# Patient Record
Sex: MALE | Race: WHITE | Employment: OTHER | URBAN - METROPOLITAN AREA
[De-identification: names, ages, dates, MRNs, and addresses within clinical notes are randomized per-mention and may not be internally consistent; named-entity substitution may affect disease eponyms.]

---

## 2019-06-06 PROCEDURE — 84402 ASSAY OF FREE TESTOSTERONE: CPT | Performed by: INTERNAL MEDICINE

## 2019-06-06 PROCEDURE — 84403 ASSAY OF TOTAL TESTOSTERONE: CPT | Performed by: INTERNAL MEDICINE

## 2019-06-06 PROCEDURE — 36415 COLL VENOUS BLD VENIPUNCTURE: CPT | Performed by: INTERNAL MEDICINE

## 2022-05-06 NOTE — LETTER
----- Message from [facility], LPN sent at 7/1/2051 11:14 AM EDT -----  Regarding: FW: Test Results    ----- Message -----  From: Shanell Mckeon  Sent: 5/4/2022   3:41 PM EDT  To: Christine Woodard Nurse Pool  Subject: Test Results                                     Will someone be contacting to explain the results of my lab work and ultrasound ordered by Dr. Gavin Barry? Date: 8/28/2023  Patient name: Hollis Herrera  YOB: 1945  Medical Record Number: [id number]  Primary Coverage: Payor: Nicolas Voss / Plan: Nicolas Voss / Product Type: Medicare /   Secondary Coverage:   Insurance ID: [id number]  Patient Address: [address] [facility] [address] [facility] [address]  Telephone Information:   Home Phone [phone]   Work Phone [phone]   Mobile [phone]         Encounter Date: 8/28/2023  Provider: Marilynn Prakash MD  Diagnosis: No diagnosis found. Wound 08/14/23 Old surgical Leg Right;Lateral;Lower (Active)   Date First Assessed: 08/14/23   Primary Wound Type: Old surgical  Location: Leg  Wound Location Orientation: Right;Lateral;Lower      Assessments 8/14/2023  8:35 AM 8/28/2023  8:00 AM   Wound Image       Closure Not approximated Not approximated   Drainage Amount Moderate Moderate   Drainage Description Yellow Yellow   Treatments Wound  Wound ;Saline   Dressing Pam;Hydrofera ready Pam;Hydrofera ready   Dressing Changed Changed Changed   Dressing Status Clean;Dry; Intact Clean;Dry; Intact   Wound Length (cm) 1.7 cm 1.4 cm   Wound Width (cm) 0.8 cm 0.6 cm   Wound Surface Area (cm^2) 1.36 cm^2 0.84 cm^2   Wound Depth (cm) 0.5 cm 0.2 cm   Wound Volume (cm^3) 0.68 cm^3 0. 168 cm^3   Wound Healing % -- 75   Margins Epibole (Rolled edges) Epibole (Rolled edges)   Non-staged Wound Description Full thickness Full thickness   Marlene-wound Assessment Red Red   Wound Granulation Tissue Pink Pink   Wound Bed Granulation (%) 90 % 90 %   Wound Bed Epithelium (%) 0 % 0 %   Wound Bed Slough (%) 10 % 10 %   Wound Bed Eschar (%) 0 % 0 %   Wound Odor None None   Undermining 12-12 oclock 0.5 cm 12 to 12=0.2cm       No associated orders. Wound Information/Order:  Wound Number:  All wounds  Product: Helena collagen (primary dressing); hydrofera ready blue (secondary dressing); kerlix; saline  Dispense: 15 days  Dressing Frequency:Change dressing 3x per week    Was a Debridement performed: Yes, Debridement type: selective

## 2023-08-14 ENCOUNTER — OFFICE VISIT (OUTPATIENT)
Dept: WOUND CARE | Facility: HOSPITAL | Age: 78
End: 2023-08-14
Attending: FAMILY MEDICINE
Payer: MEDICARE

## 2023-08-14 ENCOUNTER — HOSPITAL ENCOUNTER (OUTPATIENT)
Dept: GENERAL RADIOLOGY | Facility: HOSPITAL | Age: 78
Discharge: HOME | End: 2023-08-14
Attending: FAMILY MEDICINE
Payer: MEDICARE

## 2023-08-14 VITALS
SYSTOLIC BLOOD PRESSURE: 200 MMHG | HEIGHT: 72 IN | DIASTOLIC BLOOD PRESSURE: 98 MMHG | HEART RATE: 95 BPM | TEMPERATURE: 97 F | BODY MASS INDEX: 29.8 KG/M2 | WEIGHT: 220 LBS

## 2023-08-14 DIAGNOSIS — S81.801A OPEN WOUND OF RIGHT LOWER LEG, INITIAL ENCOUNTER: Primary | ICD-10-CM

## 2023-08-14 DIAGNOSIS — S81.801A OPEN WOUND OF RIGHT LOWER LEG, INITIAL ENCOUNTER: ICD-10-CM

## 2023-08-14 PROCEDURE — 73590 X-RAY EXAM OF LOWER LEG: CPT | Performed by: FAMILY MEDICINE

## 2023-08-14 PROCEDURE — 99204 OFFICE O/P NEW MOD 45 MIN: CPT | Performed by: FAMILY MEDICINE

## 2023-08-14 NOTE — PROGRESS NOTES
Weekly Wound Education Note                      Notes: right leg- betamethasone to periwound; fibracol; hydrofera ready; karin

## 2023-08-14 NOTE — PATIENT INSTRUCTIONS
PATIENT INSTRUCTIONS      FOR Makeda Jacobo ,  1945    DATE OF SERVICE: 2023      PLEASE CONTACT YOUR PRIMARY DOCTOR REGARDING YOUR HIGH BLOOD PRESSURE     On Thursday, 2023:  Apply a piece of Fibracol into the wound base and also tuck into the areas of undermining. Moisten this with a few drops of saline. Cover the entire wound with Hydrofera blue ready dressing. Cover with gauze roll and secure with tape. Apply a cortisone cream to the area of rash once a day until it is gone. Follow up with Dr. Bridgette Butler 1 Mirza Lighter with a daily multivitamin   Low salt diet  Intense blood sugar control - Goal Blood sugar below 180 at all times recommended. Increase protein intake / consider protein supplements - see below  Elevate extremities at all times when sitting / laying down. No tobacco use     DIETARY MODIFICATIONS TO HELP WITH WOUND HEALING:          Please follow any restrictions to diet given by your other doctors     Protein: meats, beans, eggs, milk and yogurt particularly [place] yogurt), tofu, soy nuts, soy protein products     Vitamin C: citrus fruits and juices, strawberries, tomatoes, tomato juice, peppers, baked potatoes, spinach, broccoli, cauliflower, [place] sprouts, cabbage     Vitamin A: dark greens, leafy vegetables, orange or yellow vegetables, cantaloupe, fortified dairy products, liver, fortified cereals     Zinc: fortified cereals, red meats, seafood     Consider Jose by [employer] (These are essential branch chain amino acids that help with tissue building and wound healing) and take 2 packets/day. You can order online at [employer] or [employer] REMINDERS:     The treatment plan has been discussed at length with you and your provider. Follow all instructions carefully, it is very important.  If you do not follow all instructions, you are at risk of your wound not healing, infection, possible loss of limb and even loss of life. Please call the clinic at [phone] during regular business hours ( [address]) if you notice increased redness, warmth, pain or pus like drainage or start running a fever greater than 100.3. For after hour emergencies, please call your primary physician or go to the nearest emergency room. If your blood pressure is elevated, follow up with your primary care doctor and/or cardiologist as soon as possible.

## 2023-08-21 ENCOUNTER — OFFICE VISIT (OUTPATIENT)
Dept: WOUND CARE | Facility: HOSPITAL | Age: 78
End: 2023-08-21
Attending: FAMILY MEDICINE
Payer: MEDICARE

## 2023-08-21 VITALS
TEMPERATURE: 99 F | OXYGEN SATURATION: 97 % | DIASTOLIC BLOOD PRESSURE: 86 MMHG | SYSTOLIC BLOOD PRESSURE: 177 MMHG | RESPIRATION RATE: 20 BRPM | HEART RATE: 64 BPM

## 2023-08-21 DIAGNOSIS — S81.801D OPEN WOUND OF RIGHT LOWER LEG, SUBSEQUENT ENCOUNTER: Primary | ICD-10-CM

## 2023-08-21 PROCEDURE — 99214 OFFICE O/P EST MOD 30 MIN: CPT

## 2023-08-21 NOTE — PROGRESS NOTES
Weekly Wound Education Note    Teaching Provided To: Patient  Training Topics: Nutrition;Cleasing and general instructions;Skin care  Training Method: Explain/Verbal           Notes: right leg- collagen; ready; karin

## 2023-08-21 NOTE — PATIENT INSTRUCTIONS
PATIENT INSTRUCTIONS      FOR Lesley Chavarria , JJ 1945    DATE OF SERVICE: 2023      PLEASE CONTACT YOUR PRIMARY DOCTOR REGARDING YOUR HIGH BLOOD PRESSURE     Every 2-3 days on Wednesday and Saturday  Apply a piece of collagen into the wound base and also tuck into the areas of undermining. Moisten this with a few drops of saline. Cover the entire wound with Hydrofera blue ready dressing. Cover with gauze roll and secure with tape. Apply a cortisone cream to the area of rash once a day until it is gone. Follow up with Dr. Shanta Fitzgerald 1 Amanda Kothari with a daily multivitamin   Low salt diet  Intense blood sugar control - Goal Blood sugar below 180 at all times recommended. Increase protein intake / consider protein supplements - see below  Elevate extremities at all times when sitting / laying down. No tobacco use     DIETARY MODIFICATIONS TO HELP WITH WOUND HEALING:          Please follow any restrictions to diet given by your other doctors     Protein: meats, beans, eggs, milk and yogurt particularly [place] yogurt), tofu, soy nuts, soy protein products     Vitamin C: citrus fruits and juices, strawberries, tomatoes, tomato juice, peppers, baked potatoes, spinach, broccoli, cauliflower, [place] sprouts, cabbage     Vitamin A: dark greens, leafy vegetables, orange or yellow vegetables, cantaloupe, fortified dairy products, liver, fortified cereals     Zinc: fortified cereals, red meats, seafood     Consider Jose by [employer] (These are essential branch chain amino acids that help with tissue building and wound healing) and take 2 packets/day. You can order online at [employer] or [employer] REMINDERS:     The treatment plan has been discussed at length with you and your provider. Follow all instructions carefully, it is very important.  If you do not follow all instructions, you are at risk of your wound not healing, infection, possible loss of limb and even loss of life. Please call the clinic at [phone] during regular business hours ( [address]) if you notice increased redness, warmth, pain or pus like drainage or start running a fever greater than 100.3. For after hour emergencies, please call your primary physician or go to the nearest emergency room. If your blood pressure is elevated, follow up with your primary care doctor and/or cardiologist as soon as possible.

## 2023-08-28 ENCOUNTER — OFFICE VISIT (OUTPATIENT)
Dept: WOUND CARE | Facility: HOSPITAL | Age: 78
End: 2023-08-28
Attending: FAMILY MEDICINE
Payer: MEDICARE

## 2023-08-28 VITALS
DIASTOLIC BLOOD PRESSURE: 92 MMHG | TEMPERATURE: 97 F | OXYGEN SATURATION: 97 % | RESPIRATION RATE: 20 BRPM | SYSTOLIC BLOOD PRESSURE: 200 MMHG | HEART RATE: 62 BPM

## 2023-08-28 DIAGNOSIS — S81.801D OPEN WOUND OF RIGHT LOWER LEG, SUBSEQUENT ENCOUNTER: Primary | ICD-10-CM

## 2023-08-28 PROCEDURE — 99214 OFFICE O/P EST MOD 30 MIN: CPT | Performed by: FAMILY MEDICINE

## 2023-08-28 NOTE — PROGRESS NOTES
Weekly Wound Education Note    Teaching Provided To: Patient  Training Topics: Cleasing and general instructions;Dressing;Edema control;Skin care              Notes: right leg wound- levy; ready; karin

## 2023-08-28 NOTE — PATIENT INSTRUCTIONS
PATIENT INSTRUCTIONS      FOR Michael Webb JJ 1945    DATE OF SERVICE: 2023      PLEASE CONTACT YOUR PRIMARY DOCTOR REGARDING YOUR HIGH BLOOD PRESSURE     Every 2-3 days on ,  and   Apply a piece of collagen into the wound base and also tuck into the areas of undermining. Moisten this with a few drops of saline. Cover the entire wound with Hydrofera blue ready dressing. Cover with gauze roll and secure with tape. Apply a cortisone cream to the area of rash once a day until it is gone. Follow up with Dr. Jorge Gross 1 Kathleen Flavors with a daily multivitamin   Low salt diet  Intense blood sugar control - Goal Blood sugar below 180 at all times recommended. Increase protein intake / consider protein supplements - see below  Elevate extremities at all times when sitting / laying down. No tobacco use     DIETARY MODIFICATIONS TO HELP WITH WOUND HEALING:          Please follow any restrictions to diet given by your other doctors     Protein: meats, beans, eggs, milk and yogurt particularly [place] yogurt), tofu, soy nuts, soy protein products     Vitamin C: citrus fruits and juices, strawberries, tomatoes, tomato juice, peppers, baked potatoes, spinach, broccoli, cauliflower, [place] sprouts, cabbage     Vitamin A: dark greens, leafy vegetables, orange or yellow vegetables, cantaloupe, fortified dairy products, liver, fortified cereals     Zinc: fortified cereals, red meats, seafood     Consider Jose by [employer] (These are essential branch chain amino acids that help with tissue building and wound healing) and take 2 packets/day. You can order online at [employer] or [employer] REMINDERS:     The treatment plan has been discussed at length with you and your provider. Follow all instructions carefully, it is very important.  If you do not follow all instructions, you are at risk of your wound not healing, infection, possible loss of limb and even loss of life. Please call the clinic at [phone] during regular business hours ( [address]) if you notice increased redness, warmth, pain or pus like drainage or start running a fever greater than 100.3. For after hour emergencies, please call your primary physician or go to the nearest emergency room. If your blood pressure is elevated, follow up with your primary care doctor and/or cardiologist as soon as possible.

## 2023-09-06 ENCOUNTER — TELEPHONE (OUTPATIENT)
Dept: WOUND CARE | Facility: HOSPITAL | Age: 78
End: 2023-09-06

## 2023-09-06 NOTE — TELEPHONE ENCOUNTER
Pt called clinic this morning and left a voicemail stating he needs a call back. Pt is currently receiving wound care at [place] [address], RN did try calling him to tell him he called the wrong clinic and [facility] but he did not answer and his voicemail box is full. RN sent message to staff RN at [facility] and doctor to inform them of patients call and concerns.

## 2023-09-11 ENCOUNTER — OFFICE VISIT (OUTPATIENT)
Dept: WOUND CARE | Facility: HOSPITAL | Age: 78
End: 2023-09-11
Attending: FAMILY MEDICINE
Payer: MEDICARE

## 2023-09-11 VITALS
HEART RATE: 62 BPM | OXYGEN SATURATION: 98 % | SYSTOLIC BLOOD PRESSURE: 182 MMHG | TEMPERATURE: 98 F | RESPIRATION RATE: 17 BRPM | DIASTOLIC BLOOD PRESSURE: 83 MMHG

## 2023-09-11 DIAGNOSIS — S81.801D OPEN WOUND OF RIGHT LOWER LEG, SUBSEQUENT ENCOUNTER: Primary | ICD-10-CM

## 2023-09-11 PROCEDURE — 99214 OFFICE O/P EST MOD 30 MIN: CPT | Performed by: FAMILY MEDICINE

## 2023-09-18 ENCOUNTER — OFFICE VISIT (OUTPATIENT)
Dept: WOUND CARE | Facility: HOSPITAL | Age: 78
End: 2023-09-18
Attending: FAMILY MEDICINE
Payer: MEDICARE

## 2023-09-18 VITALS
TEMPERATURE: 98 F | SYSTOLIC BLOOD PRESSURE: 164 MMHG | OXYGEN SATURATION: 96 % | DIASTOLIC BLOOD PRESSURE: 84 MMHG | HEART RATE: 61 BPM

## 2023-09-18 DIAGNOSIS — S81.801D OPEN WOUND OF RIGHT LOWER LEG, SUBSEQUENT ENCOUNTER: Primary | ICD-10-CM

## 2023-09-18 PROCEDURE — 99214 OFFICE O/P EST MOD 30 MIN: CPT | Performed by: FAMILY MEDICINE

## 2023-09-18 NOTE — PATIENT INSTRUCTIONS
PATIENT INSTRUCTIONS      FOR Santiago Chavez JJ 1945    DATE OF SERVICE: 2023      PLEASE CONTACT YOUR PRIMARY DOCTOR REGARDING YOUR HIGH BLOOD PRESSURE     On  and ,  Apply a piece of endoform collagen into the wound base and also tuck into the areas of undermining. Moisten this with a few drops of saline. Cover the entire wound with Hydrofera blue ready dressing. Cover with gauze roll and secure with tape. Follow up with Dr. Gary Aranda with a daily multivitamin   Low salt diet  Intense blood sugar control - Goal Blood sugar below 180 at all times recommended. Increase protein intake / consider protein supplements - see below  Elevate extremities at all times when sitting / laying down. No tobacco use     DIETARY MODIFICATIONS TO HELP WITH WOUND HEALING:          Please follow any restrictions to diet given by your other doctors     Protein: meats, beans, eggs, milk and yogurt particularly [place] yogurt), tofu, soy nuts, soy protein products     Vitamin C: citrus fruits and juices, strawberries, tomatoes, tomato juice, peppers, baked potatoes, spinach, broccoli, cauliflower, [place] sprouts, cabbage     Vitamin A: dark greens, leafy vegetables, orange or yellow vegetables, cantaloupe, fortified dairy products, liver, fortified cereals     Zinc: fortified cereals, red meats, seafood     Consider Jose by [employer] (These are essential branch chain amino acids that help with tissue building and wound healing) and take 2 packets/day. You can order online at [employer] or [employer] REMINDERS:     The treatment plan has been discussed at length with you and your provider. Follow all instructions carefully, it is very important. If you do not follow all instructions, you are at risk of your wound not healing, infection, possible loss of limb and even loss of life.   Please call the clinic at [phone] during regular business hours ( 8AM - 4PM) if you notice increased redness, warmth, pain or pus like drainage or start running a fever greater than 100.3. For after hour emergencies, please call your primary physician or go to the nearest emergency room. If your blood pressure is elevated, follow up with your primary care doctor and/or cardiologist as soon as possible.

## 2023-09-18 NOTE — PROGRESS NOTES
Weekly Wound Education Note    Teaching Provided To: Patient  Training Topics: Cleasing and general instructions;Dressing;Edema control;Skin care  Training Method: Explain/Verbal;Demonstration  Training Response: Patient responds and understands        Notes: RLE- endoform; ready; karin; spandagrip

## 2023-10-02 ENCOUNTER — OFFICE VISIT (OUTPATIENT)
Dept: WOUND CARE | Facility: HOSPITAL | Age: 78
End: 2023-10-02
Attending: FAMILY MEDICINE
Payer: MEDICARE

## 2023-10-02 ENCOUNTER — TELEPHONE (OUTPATIENT)
Dept: WOUND CARE | Facility: HOSPITAL | Age: 78
End: 2023-10-02

## 2023-10-02 VITALS
OXYGEN SATURATION: 92 % | DIASTOLIC BLOOD PRESSURE: 83 MMHG | SYSTOLIC BLOOD PRESSURE: 169 MMHG | RESPIRATION RATE: 18 BRPM | TEMPERATURE: 97 F | HEART RATE: 66 BPM

## 2023-10-02 DIAGNOSIS — S81.801D OPEN WOUND OF RIGHT LOWER LEG, SUBSEQUENT ENCOUNTER: Primary | ICD-10-CM

## 2023-10-02 PROCEDURE — 99214 OFFICE O/P EST MOD 30 MIN: CPT | Performed by: FAMILY MEDICINE

## 2023-10-02 NOTE — PATIENT INSTRUCTIONS
PATIENT INSTRUCTIONS      FOR Nena Nascimento ,  1945    DATE OF SERVICE: 10/2/2023          On Monday, Wednesday, and Friday:     Apply a piece of endoform collagen into the wound base and also tuck into the areas of undermining. Moisten this with a few drops of saline. Cover the entire wound with Hydrofera blue ready dressing. Cover with gauze roll and secure with tape. Follow up with Dr. Oskar Roque [address] [place] [address] with a daily multivitamin   Low salt diet  Intense blood sugar control - Goal Blood sugar below 180 at all times recommended. Increase protein intake / consider protein supplements - see below  Elevate extremities at all times when sitting / laying down. No tobacco use     DIETARY MODIFICATIONS TO HELP WITH WOUND HEALING:          Please follow any restrictions to diet given by your other doctors     Protein: meats, beans, eggs, milk and yogurt particularly [place] yogurt), tofu, soy nuts, soy protein products     Vitamin C: citrus fruits and juices, strawberries, tomatoes, tomato juice, peppers, baked potatoes, spinach, broccoli, cauliflower, [place] sprouts, cabbage     Vitamin A: dark greens, leafy vegetables, orange or yellow vegetables, cantaloupe, fortified dairy products, liver, fortified cereals     Zinc: fortified cereals, red meats, seafood     Consider Jose by [employer] (These are essential branch chain amino acids that help with tissue building and wound healing) and take 2 packets/day. You can order online at [employer] or [employer] REMINDERS:     The treatment plan has been discussed at length with you and your provider. Follow all instructions carefully, it is very important. If you do not follow all instructions, you are at risk of your wound not healing, infection, possible loss of limb and even loss of life.   Please call the clinic at [phone] during regular business hours ( [address]) if you notice increased redness, warmth, pain or pus like drainage or start running a fever greater than 100.3. For after hour emergencies, please call your primary physician or go to the nearest emergency room. If your blood pressure is elevated, follow up with your primary care doctor and/or cardiologist as soon as possible.

## 2023-10-02 NOTE — PROGRESS NOTES
Weekly Wound Education Note    Teaching Provided To: Patient  Training Topics: Cleasing and general instructions;Dressing  Training Method: Demonstration;Explain/Verbal  Training Response: Patient responds and understands        Notes: right lateral lower leg-endoform, hydrofera blue transfer, karin, RTC in 2 weeks, Kerecis ordered

## 2023-10-16 ENCOUNTER — OFFICE VISIT (OUTPATIENT)
Dept: WOUND CARE | Facility: HOSPITAL | Age: 78
End: 2023-10-16
Attending: FAMILY MEDICINE
Payer: MEDICARE

## 2023-10-16 VITALS
HEART RATE: 52 BPM | DIASTOLIC BLOOD PRESSURE: 87 MMHG | SYSTOLIC BLOOD PRESSURE: 195 MMHG | TEMPERATURE: 97 F | OXYGEN SATURATION: 98 % | RESPIRATION RATE: 18 BRPM

## 2023-10-16 DIAGNOSIS — S81.801D OPEN WOUND OF RIGHT LOWER LEG, SUBSEQUENT ENCOUNTER: Primary | ICD-10-CM

## 2023-10-16 PROCEDURE — 15271 SKIN SUB GRAFT TRNK/ARM/LEG: CPT | Performed by: FAMILY MEDICINE

## 2023-10-16 NOTE — PROGRESS NOTES
Patient ID: Bhanu Medel is a 66year old male. Cellular tissue product application Old surgical Right;Lateral;Lower Leg    Date/Time: 10/16/2023 2:06 PM    Performed by: Jayme Villar MD  Authorized by: Jayme Villar MD  Associated wounds:   Wound 08/14/23 Old surgical Leg Right;Lateral;Lower  Consent:     Consent obtained:  Verbal    Consent given by:  Patient    Alternatives discussed:  No treatment  Universal protocol:     Procedure explained and questions answered to patient or proxy's satisfaction: yes      Relevant documents present and verified: yes      Site/side marked: yes      Patient identity confirmed:  Verbally with patient  Pre-procedure details:     Preparation: Patient was prepped and draped in usual sterile fashion    Anesthesia (see MAR for exact dosages): Anesthesia method:  None  Procedure details:     Location:  trunk/arms/legs    Product applied:  Kerecis omega3    Product lot #:  [id number]    Product expiration:  6/1/2026    Amount used (cm^2): 1    Amount wasted (cm^2): 1    Reason for waste:  Wound size    Saline lot #:  [id number]    Saline expiration:  5/1/2028    Secured/Fixated: Yes      Secured/Fixated with:  Mepitel 1,steristrips, mastisol  Dressing:     Dressing applied:  Steri-Strips and 4x4    Wrapped with:  Pam 4 inch  Post-procedure details:     Patient tolerance of procedure:   Tolerated well, no immediate complications

## 2023-10-16 NOTE — PROGRESS NOTES
Weekly Wound Education Note    Teaching Provided To: Patient  Training Topics: Cleasing and general instructions;Dressing  Training Method: Explain/Verbal;Demonstration  Training Response: Patient responds and understands        Notes: RLE-kerecis, mepitel1,steristrips,dry gauze, Kerecis ordered for next visit

## 2023-10-16 NOTE — PATIENT INSTRUCTIONS
PATIENT INSTRUCTIONS      FOR JJ Goss 1945    DATE OF SERVICE: 10/16/2023      Kerecis No. 1 applied today  Contact layer with Steri-Strips, do not remove mesh    4 x 4 gauze and gauze roll, change this daily and apply a 3 to 4 drops of saline through the mesh onto the wound each day        Follow up with Dr. Jeanine Mcdaniel 1 WEEK

## 2023-10-23 ENCOUNTER — OFFICE VISIT (OUTPATIENT)
Dept: WOUND CARE | Facility: HOSPITAL | Age: 78
End: 2023-10-23
Attending: FAMILY MEDICINE
Payer: MEDICARE

## 2023-10-23 VITALS
SYSTOLIC BLOOD PRESSURE: 170 MMHG | OXYGEN SATURATION: 95 % | TEMPERATURE: 97 F | RESPIRATION RATE: 17 BRPM | DIASTOLIC BLOOD PRESSURE: 76 MMHG | HEART RATE: 69 BPM

## 2023-10-23 DIAGNOSIS — S81.801D OPEN WOUND OF RIGHT LOWER LEG, SUBSEQUENT ENCOUNTER: Primary | ICD-10-CM

## 2023-10-23 PROCEDURE — 99214 OFFICE O/P EST MOD 30 MIN: CPT | Performed by: FAMILY MEDICINE

## 2023-10-23 NOTE — PATIENT INSTRUCTIONS
PATIENT INSTRUCTIONS      FOR JJ Hardin 1945    DATE OF SERVICE: 10/23/2023      The dressing should be changed on Wednesday 10/25 and Saturday 10/28  Apply endoform to the wound base  Cover with saline moistened gauze  Cover with dry gauze roll and secure with tape      Follow up with Dr. Hubert Blum 1 WEEK
po supplement

## 2023-10-23 NOTE — PROGRESS NOTES
Weekly Wound Education Note    Teaching Provided To: Patient  Training Topics: Cleasing and general instructions;Dressing  Training Method: Explain/Verbal;Demonstration  Training Response: Patient responds and understands        Notes: RLE-endoform , dry gauze, karin, patient to change dressing twice this week              [facility] [place] verification sent

## 2023-10-30 ENCOUNTER — OFFICE VISIT (OUTPATIENT)
Dept: WOUND CARE | Facility: HOSPITAL | Age: 78
End: 2023-10-30
Attending: FAMILY MEDICINE

## 2023-10-30 VITALS
SYSTOLIC BLOOD PRESSURE: 153 MMHG | OXYGEN SATURATION: 96 % | DIASTOLIC BLOOD PRESSURE: 79 MMHG | RESPIRATION RATE: 17 BRPM | HEART RATE: 60 BPM | TEMPERATURE: 97 F

## 2023-10-30 DIAGNOSIS — S81.801D OPEN WOUND OF RIGHT LOWER LEG, SUBSEQUENT ENCOUNTER: Primary | ICD-10-CM

## 2023-10-30 PROCEDURE — 99214 OFFICE O/P EST MOD 30 MIN: CPT | Performed by: FAMILY MEDICINE

## 2023-10-30 NOTE — PATIENT INSTRUCTIONS
PATIENT INSTRUCTIONS      FOR JJ Bhagat 1945    DATE OF SERVICE: 10/30/2023      Moisten a piece of Acticoat dressing with tap water before applying. Then apply a square piece of Acticoat dressing to the wound and surrounding edges and use a small piece of gauze in the center of the wound to bolster and press it down and then cover with a border gauze dressing. Change on a daily basis.         Follow up with Dr. Alfredo Zhu 1 WEEK

## 2023-10-30 NOTE — PROGRESS NOTES
Weekly Wound Education Note    Teaching Provided To: Patient  Training Topics: Cleasing and general instructions;Dressing  Training Method: Demonstration;Explain/Verbal  Training Response: Patient responds and understands        Notes: Right leg-acticoat moistened with water, dry gauze, foam dressing

## 2023-11-06 ENCOUNTER — OFFICE VISIT (OUTPATIENT)
Dept: WOUND CARE | Facility: HOSPITAL | Age: 78
End: 2023-11-06
Attending: FAMILY MEDICINE
Payer: MEDICARE

## 2023-11-06 VITALS
SYSTOLIC BLOOD PRESSURE: 125 MMHG | OXYGEN SATURATION: 96 % | HEART RATE: 75 BPM | DIASTOLIC BLOOD PRESSURE: 77 MMHG | TEMPERATURE: 98 F | RESPIRATION RATE: 18 BRPM

## 2023-11-06 DIAGNOSIS — S81.801D OPEN WOUND OF RIGHT LOWER LEG, SUBSEQUENT ENCOUNTER: Primary | ICD-10-CM

## 2023-11-06 PROCEDURE — 99214 OFFICE O/P EST MOD 30 MIN: CPT | Performed by: FAMILY MEDICINE

## 2023-11-06 NOTE — PROGRESS NOTES
Weekly Wound Education Note    Teaching Provided To: Patient  Training Topics: Cleasing and general instructions;Dressing;Negative presssure therapy  Training Method: Demonstration;Explain/Verbal  Training Response: Patient responds and understands        Notes: Right leg-Barby NPWT

## 2023-11-06 NOTE — PATIENT INSTRUCTIONS
PATIENT INSTRUCTIONS      FOR Makeda Jacobo ,  1945    DATE OF SERVICE: 2023      SHLOMO applied to the wound   The light on the device should be green, if not try to press around the dressing to ensure a good seal or apply extra tape. DO NOT get wet.          Follow up with Dr. Bridgette Butler 1 WEEK

## 2023-11-13 ENCOUNTER — OFFICE VISIT (OUTPATIENT)
Dept: WOUND CARE | Facility: HOSPITAL | Age: 78
End: 2023-11-13
Attending: FAMILY MEDICINE
Payer: MEDICARE

## 2023-11-13 VITALS
SYSTOLIC BLOOD PRESSURE: 155 MMHG | HEART RATE: 55 BPM | DIASTOLIC BLOOD PRESSURE: 84 MMHG | OXYGEN SATURATION: 97 % | RESPIRATION RATE: 17 BRPM

## 2023-11-13 DIAGNOSIS — S81.801D OPEN WOUND OF RIGHT LOWER LEG, SUBSEQUENT ENCOUNTER: Primary | ICD-10-CM

## 2023-11-13 PROCEDURE — 99214 OFFICE O/P EST MOD 30 MIN: CPT | Performed by: FAMILY MEDICINE

## 2023-11-13 NOTE — PATIENT INSTRUCTIONS
PATIENT INSTRUCTIONS      FOR JJ Montes 1945    DATE OF SERVICE: 2023    Helena collagen to the wound base  SHLOMO applied to the wound   The light on the device should be green, if not try to press around the dressing to ensure a good seal or apply extra tape. DO NOT get wet.        Nurse visit on  and   Follow up with Dr. Shaila Rodriguez 3 WEEKS

## 2023-11-13 NOTE — PROGRESS NOTES
Weekly Wound Education Note    Teaching Provided To: Patient  Training Topics: Cleasing and general instructions;Dressing;Negative presssure therapy  Training Method: Demonstration;Explain/Verbal  Training Response: Patient responds and understands        Notes: Right leg-Fibracol,Barby NPWT

## 2023-11-20 ENCOUNTER — NURSE ONLY (OUTPATIENT)
Dept: WOUND CARE | Facility: HOSPITAL | Age: 78
End: 2023-11-20
Attending: FAMILY MEDICINE
Payer: MEDICARE

## 2023-11-20 VITALS
HEART RATE: 57 BPM | TEMPERATURE: 97 F | SYSTOLIC BLOOD PRESSURE: 156 MMHG | DIASTOLIC BLOOD PRESSURE: 79 MMHG | OXYGEN SATURATION: 96 % | RESPIRATION RATE: 18 BRPM

## 2023-11-20 DIAGNOSIS — S81.801D OPEN WOUND OF RIGHT LOWER LEG, SUBSEQUENT ENCOUNTER: ICD-10-CM

## 2023-11-20 PROCEDURE — 97607 NEG PRS WND THR NDME<=50SQCM: CPT

## 2023-11-20 NOTE — PROGRESS NOTES
11/20/23 1029   Negative Pressure Wound Therapy Leg Right; Lower   Placement Date: 11/06/23   Wound Type: Surgical  Location: Leg  Wound Location Orientation: Right; Lower   Wound photographed/measured Yes   Machine Status (On) Yes   Site Assessment Dry;Pink;Pale;Yellow   Marlene-wound Assessment Pink   Unit Type SHLOMO   Dressing Type Collagen   Number of Foam Pieces Used 1   Cycle Continuous; On   Target Pressure (mmHg)   (preset 80 mmhg)   Drainage Description Serosanguineous   Dressing Status Clean;Dry; Intact   Wound 08/14/23 Old surgical Leg Right;Lateral;Lower   Date First Assessed: 08/14/23   Primary Wound Type: Old surgical  Location: Leg  Wound Location Orientation: Right;Lateral;Lower   Wound Image    Site Assessment Dry;Pink;Pale;Yellow   Closure Not approximated   Drainage Amount Small   Drainage Description Serosanguineous   Treatments Wound    Dressing Wound vac sponge  (Fibracol,Shlomo NPWT at 80 mmhg)   Dressing Changed Changed   Dressing Status Dressing Changed   Wound Length (cm) 0.9 cm   Wound Width (cm) 0.4 cm   Wound Surface Area (cm^2) 0.36 cm^2   Wound Depth (cm) 0.3 cm   Wound Volume (cm^3) 0.108 cm^3   Wound Healing % 84   Margins Epibole (Rolled edges)   Non-staged Wound Description Full thickness   Marlene-wound Assessment Pink   Wound Granulation Tissue Pink   Wound Bed Granulation (%) 90 %   Wound Bed Epithelium (%) 0 %   Wound Bed Slough (%) 10 %   Wound Bed Eschar (%) 0 %   Wound Bed Fibrin (%) 0 %   Wound Odor None   Undermining 0.2   Undermining?  Yes   Number of Undermines 1   Undermine 1 Start Position 12   Undermine 1 End Position 6     Weekly Wound Education Note    Teaching Provided To: Patient  Training Topics: Cleasing and general instructions;Dressing;Negative presssure therapy  Training Method: Demonstration;Explain/Verbal  Training Response: Patient responds and understands        Notes: Right leg-Fibracol,Shlomo NPWT

## 2023-11-27 ENCOUNTER — NURSE ONLY (OUTPATIENT)
Dept: WOUND CARE | Facility: HOSPITAL | Age: 78
End: 2023-11-27
Attending: FAMILY MEDICINE
Payer: MEDICARE

## 2023-11-27 VITALS
DIASTOLIC BLOOD PRESSURE: 90 MMHG | OXYGEN SATURATION: 98 % | RESPIRATION RATE: 18 BRPM | SYSTOLIC BLOOD PRESSURE: 154 MMHG | TEMPERATURE: 96 F | HEART RATE: 48 BPM

## 2023-11-27 DIAGNOSIS — S81.801D OPEN WOUND OF RIGHT LOWER LEG, SUBSEQUENT ENCOUNTER: Primary | ICD-10-CM

## 2023-11-27 PROCEDURE — 97607 NEG PRS WND THR NDME<=50SQCM: CPT

## 2023-11-27 NOTE — PROGRESS NOTES
11/27/23 1054   Negative Pressure Wound Therapy Leg Right; Lower   Placement Date: 11/06/23   Wound Type: Surgical  Location: Leg  Wound Location Orientation: Right; Lower   Wound photographed/measured Yes   Machine Status (On) Yes   Site Assessment Dry;Pink;Pale;Yellow   Marlene-wound Assessment Pink   Unit Type SHLOMO   Dressing Type Collagen   Number of Foam Pieces Used 1   Cycle Continuous; On   Target Pressure (mmHg)   (preset 80 mmhg)   Drainage Description Serosanguineous   Dressing Status Clean;Dry; Intact   Wound 08/14/23 Old surgical Leg Right;Lateral;Lower   Date First Assessed: 08/14/23   Primary Wound Type: Old surgical  Location: Leg  Wound Location Orientation: Right;Lateral;Lower   Wound Image    Site Assessment Dry;Pink;Pale;Yellow   Closure Not approximated   Drainage Amount Small   Drainage Description Serosanguineous   Treatments Wound    Dressing Wound vac sponge  (Fibracol,Shlomo NPWT at 80 mmhg)   Dressing Changed Changed   Dressing Status Dressing Changed   Wound Length (cm) 0.9 cm   Wound Width (cm) 0.5 cm   Wound Surface Area (cm^2) 0.45 cm^2   Wound Depth (cm) 0.3 cm   Wound Volume (cm^3) 0.135 cm^3   Wound Healing % 80   Margins Epibole (Rolled edges); Not attached   Non-staged Wound Description Full thickness   Marlene-wound Assessment Pink   Wound Granulation Tissue Pink   Wound Bed Fibrin (%) 0 %   Wound Odor None   Undermining 0.9   Undermining?  Yes   Number of Undermines 1   Undermine 1 Start Position 12   Undermine 1 End Position 12     Weekly Wound Education Note    Teaching Provided To: Patient  Training Topics: Cleasing and general instructions;Dressing;Negative presssure therapy  Training Method: Demonstration;Explain/Verbal  Training Response: Patient responds and understands        Notes: Right leg-Fibracol,Shlomo NPWT

## 2023-12-04 ENCOUNTER — OFFICE VISIT (OUTPATIENT)
Dept: WOUND CARE | Facility: HOSPITAL | Age: 78
End: 2023-12-04
Attending: FAMILY MEDICINE
Payer: MEDICARE

## 2023-12-04 VITALS
SYSTOLIC BLOOD PRESSURE: 120 MMHG | OXYGEN SATURATION: 92 % | RESPIRATION RATE: 17 BRPM | DIASTOLIC BLOOD PRESSURE: 71 MMHG | HEART RATE: 66 BPM

## 2023-12-04 DIAGNOSIS — S81.801D OPEN WOUND OF RIGHT LOWER LEG, SUBSEQUENT ENCOUNTER: Primary | ICD-10-CM

## 2023-12-04 PROCEDURE — 99214 OFFICE O/P EST MOD 30 MIN: CPT | Performed by: FAMILY MEDICINE

## 2023-12-04 NOTE — PATIENT INSTRUCTIONS
PATIENT INSTRUCTIONS      FOR JJ Prabhakar 1945    DATE OF SERVICE: 2023      [place] collagen to the wound base  SHLOMO applied to the wound   The light on the device should be green, if not try to press around the dressing to ensure a good seal or apply extra tape. DO NOT get wet.      Follow up with Dr. Anuradha Chirinos 1 WEEK none

## 2023-12-11 ENCOUNTER — OFFICE VISIT (OUTPATIENT)
Dept: WOUND CARE | Facility: HOSPITAL | Age: 78
End: 2023-12-11
Attending: FAMILY MEDICINE
Payer: MEDICARE

## 2023-12-11 VITALS
HEART RATE: 69 BPM | OXYGEN SATURATION: 98 % | RESPIRATION RATE: 18 BRPM | DIASTOLIC BLOOD PRESSURE: 85 MMHG | TEMPERATURE: 97 F | SYSTOLIC BLOOD PRESSURE: 141 MMHG

## 2023-12-11 DIAGNOSIS — S81.801D OPEN WOUND OF RIGHT LOWER LEG, SUBSEQUENT ENCOUNTER: Primary | ICD-10-CM

## 2023-12-11 PROCEDURE — 99214 OFFICE O/P EST MOD 30 MIN: CPT | Performed by: FAMILY MEDICINE

## 2023-12-11 NOTE — PROGRESS NOTES
Weekly Wound Education Note    Teaching Provided To: Patient  Training Topics: Cleasing and general instructions;Dressing  Training Method: Explain/Verbal;Demonstration  Training Response: Patient responds and understands        Notes: Right lower leg- wet to dry gauze dressing, karin. Patient scheduled to see Dr. Oralia Vega this afternoon. Discharged for now.

## 2023-12-11 NOTE — PATIENT INSTRUCTIONS
PATIENT INSTRUCTIONS      FOR JJ Schwartz 1945    DATE OF SERVICE: 2023      Will discharge from wound care at this time    Follow-up with plastic surgeon and his recommendations for wound closure and wound dressings moving forward    If for some reason you do not have surgery to correct the wound then follow-up in wound clinic

## 2024-02-05 ENCOUNTER — TELEPHONE (OUTPATIENT)
Dept: WOUND CARE | Facility: HOSPITAL | Age: 79
End: 2024-02-05

## 2024-04-04 RX ORDER — VITAMIN B COMPLEX
1 TABLET ORAL DAILY
COMMUNITY

## 2024-04-04 RX ORDER — PERPHENAZINE 4 MG
1 TABLET ORAL
COMMUNITY

## 2024-04-04 RX ORDER — METOPROLOL SUCCINATE 50 MG/1
50 TABLET, EXTENDED RELEASE ORAL 2 TIMES DAILY
COMMUNITY
Start: 2023-12-05

## 2024-04-04 RX ORDER — LOSARTAN POTASSIUM AND HYDROCHLOROTHIAZIDE 12.5; 5 MG/1; MG/1
1 TABLET ORAL 2 TIMES DAILY
COMMUNITY
Start: 2023-10-16

## 2024-04-23 ENCOUNTER — HOSPITAL ENCOUNTER (OUTPATIENT)
Facility: HOSPITAL | Age: 79
Setting detail: HOSPITAL OUTPATIENT SURGERY
Discharge: HOME | End: 2024-04-23
Attending: PLASTIC SURGERY | Admitting: PLASTIC SURGERY

## 2024-04-23 VITALS
SYSTOLIC BLOOD PRESSURE: 123 MMHG | HEIGHT: 72 IN | DIASTOLIC BLOOD PRESSURE: 66 MMHG | OXYGEN SATURATION: 99 % | HEART RATE: 66 BPM | BODY MASS INDEX: 31.42 KG/M2 | WEIGHT: 232 LBS | RESPIRATION RATE: 18 BRPM | TEMPERATURE: 98 F

## 2024-04-23 PROCEDURE — 0JUN0JZ SUPPLEMENT OF RIGHT LOWER LEG SUBCUTANEOUS TISSUE AND FASCIA WITH SYNTHETIC SUBSTITUTE, OPEN APPROACH: ICD-10-PCS | Performed by: PLASTIC SURGERY

## 2024-04-23 PROCEDURE — P9047 ALBUMIN (HUMAN), 25%, 50ML: HCPCS

## 2024-04-23 PROCEDURE — 0HRKXJZ REPLACEMENT OF RIGHT LOWER LEG SKIN WITH SYNTHETIC SUBSTITUTE, EXTERNAL APPROACH: ICD-10-PCS | Performed by: PLASTIC SURGERY

## 2024-04-23 PROCEDURE — 0HBKXZZ EXCISION OF RIGHT LOWER LEG SKIN, EXTERNAL APPROACH: ICD-10-PCS | Performed by: PLASTIC SURGERY

## 2024-04-23 RX ORDER — SPIRONOLACTONE 25 MG/1
25 TABLET ORAL 2 TIMES DAILY
COMMUNITY
Start: 2024-01-17 | End: 2024-08-14

## 2024-04-23 RX ORDER — LIDOCAINE HYDROCHLORIDE AND EPINEPHRINE 10; 10 MG/ML; UG/ML
INJECTION, SOLUTION INFILTRATION; PERINEURAL AS NEEDED
Status: DISCONTINUED | OUTPATIENT
Start: 2024-04-23 | End: 2024-04-23 | Stop reason: HOSPADM

## 2024-04-23 RX ORDER — CEPHALEXIN 500 MG/1
500 CAPSULE ORAL 4 TIMES DAILY
Qty: 40 CAPSULE | Refills: 0 | Status: SHIPPED | OUTPATIENT
Start: 2024-04-23

## 2024-04-23 RX ORDER — ALBUMIN (HUMAN) 12.5 G/50ML
SOLUTION INTRAVENOUS
Status: DISCONTINUED
Start: 2024-04-23 | End: 2024-04-23 | Stop reason: WASHOUT

## 2024-04-23 NOTE — DISCHARGE INSTRUCTIONS
POST OPERATIVE INSTRUCTIONS    You may not get the right leg and operative site wet.  Please take pain medicine as needed, tylenol and Ibuprofen.  Please avoid trauma to the area.  Please keep the surgical site protected.  Please call if you develop signs of infection or fluid collection.  There will be a sewn on dressing, please leave that in place until follow up.  Please elevate your leg as much as possible.

## 2024-04-23 NOTE — H&P
Date: 2024      Patient Name: Kevin Lindsey    : 1945     MRN: [id number]     Age: 78 year old       Chief Complaint:  No chief complaint on file.      History of Present Illness:    77 yo male presents for debridement of RLE wound and matrix placement  Past Medical History:   Past Medical History:    Cancer (HCC)    skin    High blood pressure    High cholesterol    Hx of diseases NEC    ED    Hx of motion sickness    vertigo    Hypercholesterolemia    Other and unspecified hyperlipidemia    Unspecified essential hypertension       Past Surgical History:   Past Surgical History:   Procedure Laterality Date    Colonoscopy,biopsy N/A 10/22/2015    Procedure: COLONOSCOPY, POSSIBLE BIOPSY, POSSIBLE POLYPECTOMY 75972;  Surgeon: Venkata Chavez MD;  Location: [facility]    Colonoscopy,diagnostic           Patient documented not to have experienced any of the following events  2013    Procedure: HERNIA REPAIR INGUINAL ADULT;  Surgeon: Toribio Galicia MD;  Location: [facility]    Patient documented not to have experienced any of the following events N/A 10/22/2015    Procedure: COLONOSCOPY, POSSIBLE BIOPSY, POSSIBLE POLYPECTOMY 63118;  Surgeon: Venkata Chavez MD;  Location: [facility]    Patient with preoperative order for iv antibiotic surgical site infect  2013    Procedure: HERNIA REPAIR INGUINAL ADULT;  Surgeon: Toribio Galicia MD;  Location: [facility]    Patient withough preoperative order for iv antibiotic surgical site infection prophylaxis. N/A 10/22/2015    Procedure: COLONOSCOPY, POSSIBLE BIOPSY, POSSIBLE POLYPECTOMY 41530;  Surgeon: Venkata Chavez MD;  Location: [facility]    Removal of sperm cord lesion  2013    Procedure: HERNIA REPAIR INGUINAL ADULT;  Surgeon: Toribio Galicia MD;  Location: [facility]    Repair ing hernia,5+y/o,reducibl  2013    Procedure: HERNIA REPAIR INGUINAL  ADULT;  Surgeon: Toribio Galicia MD;  [facility] [facility] [place]       Family History:   family history includes Cancer in his paternal grandfather; Heart Disorder in his father and maternal grandmother; Other in his father.  Family Status   Relation Status    Fa  at age 90yrs    Mo Alive    PGFA (Not Specified)    MGMA (Not Specified)       Gynecological History:    Obstetrical History:       Social History:   Social History     Socioeconomic History    Marital status:    Tobacco Use    Smoking status: Former     Current packs/day: 0.00     Types: Cigarettes     Quit date: 1969     Years since quittin.5    Smokeless tobacco: Never    Tobacco comments:     40 yrs ago   Vaping Use    Vaping status: Never Used   Substance and Sexual Activity    Alcohol use: Yes     Comment: 1-2 times per week    Drug use: No    Sexual activity: Yes     Partners: Female   Other Topics Concern     Service No    Blood Transfusions No    Caffeine Concern No     Comment: drinks decaf     Occupational Exposure No    Hobby Hazards No    Sleep Concern No    Stress Concern No    Weight Concern Yes     Comment: would like to lose weight     Special Diet No    Back Care Yes    Exercise Yes     Comment: 3-4 days a week     Seat Belt Yes    Self-Exams No       Allergies:   Allergies   Allergen Reactions    Zofran SWELLING       Medications:  No current facility-administered medications on file prior to encounter.     Current Outpatient Medications on File Prior to Encounter   Medication Sig Dispense Refill    spironolactone 25 MG Oral Tab Take 1 tablet (25 mg total) by mouth 2 (two) times daily.      losartan-hydroCHLOROthiazide 50-12.5 MG Oral Tab Take 1 tablet by mouth 2 (two) times daily.      metoprolol succinate ER 50 MG Oral Tablet 24 Hr Take 1 tablet (50 mg total) by mouth 2 (two) times daily.      Coenzyme Q10 (COQ10) 100 MG Oral Cap Take 1 capsule by mouth daily.      Collagen-Vitamin C-Biotin  (COLLAGEN 1500/C) 500-50-0.8 MG Oral Cap Take 1 capsule by mouth 3 (three) times a week.      amLODIPine 10 MG Oral Tab Take 1 tablet (10 mg total) by mouth daily. 90 tablet 3    atorvastatin 20 MG Oral Tab Take 1 tablet (20 mg total) by mouth daily. 90 tablet 3    Meclizine HCl 50 MG Oral Tab Take 1 tablet (50 mg total) by mouth 2 (two) times daily as needed for Dizziness. 30 tablet 1    Tadalafil (CIALIS) 20 MG Oral Tab As directed. (Patient taking differently: as needed. As directed.) 30 tablet 3       Review of Systems:     General: Alert. Not in acute distress. Negative for chills, fatigue, fever, loss of appetite, weight change. Cardiology: Negative for: chest pain, edema, orthopnea, palpitations, shortness of breath. Endocrinology: Negative for: heat/cold intolerance, polyuria, unexplained change in weight. Gastroenterology: Negative for: nausea, vomiting, diarrhea, constipation, abdominal pain.  : Negative for: abdominal pain, constipation, diarrhea, dysuria, nausea, urinary frequency, urinary urgency. HEENT: Negative for: ear pain, eye pain, headache, sore throat, vision problems, blurry vision. Musculoskeletal: Negative for: joint pain, joint swelling, muscle pain, rash. Neurology: Negative for: new/recent loss of coordination, new/recent motor/sensory losses, unstable gait. Psychology: Negative for: anxiety, depressed mood, delusions, depression, loss of appetite. Pulmonology: Negative for: breathing problems, chest pain with breathing, chest tightness, cough, shortness of breath, sputum. Skin: Negative for: rash, redness, dryness, pruritis/itching, jaundice.     Physical Exam:     Vitals:   /80 (BP Location: Right arm)   Pulse 61   Temp 98.3 °F (36.8 °C) (Temporal)   Resp 16   Ht 6' (1.829 m)   Wt 232 lb (105.2 kg)   SpO2 100%   BMI 31.46 kg/m²     Physical Exam:  GENERAL APPEARANCE: well developed, well nourished, in no acute distress.   HEENT: sclera anicteric, PERRL, EOMI, no cervical  adenopathy.   NECK: supple, no masses, no supraclavicular adenopathy.   LUNGS: clear to auscultation , bilaterally , no respiratory distress.   EXTREMITIES: no upper extremity lymphedema.   SKIN: grossly intact with no obvious lesions.   NEUROLOGIC EXAM: alert and oriented x 3.   MUSCULOSKELETAL: gait normal.   PSYCH: appropriate affect.     Impression/Plan:     77 yo male presents for debridement of RLE wound and matrix placement.  Risks and benefits of procedure discussed and all questions answered

## 2024-05-13 NOTE — OPERATIVE REPORT
PREOPERATIVE DIAGNOSIS:  RLE wound, 3cm  POSTOPERATIVE DIAGNOSIS:  same  PROCEDURE:  debridement and placement of BTM     SURGEON: Salma Snowden MD     Complication: none apparent     ANESTHESIA:  local.          INDICATIONS:  The patient is a very pleasant 78 year old year old male with a chronic wound of the RLE. We discussed debridement and BTM placement. Risks and benefits of the procedure discussed. Risks discussed include but are not limited to bleeding, infection, pain, scar, need for additional procedures. All questions answered       OPERATIVE TECHNIQUE:  The patient was appropriately identified and taken to the operating room.  There, he was positioned on the operating table in the supine fashion.  following this a timeout was performed and he was prepped and draped in the usual sterile fashion. Local anesthetic was then injected for a filed block. Sharp debridement was then performed until bleeding tissue was reached. Wound was then irrigated with antibiotic solution and BTM placed and secured on the wound. This was then further secured with a bolster dressing. Sterile dressings were applied and patient taken to recovery room in stable condition.   No heavy lifting/Weight bearing as tolerated

## (undated) DEVICE — 3M™ IOBAN™ 2 ANTIMICROBIAL INCISE DRAPE 6648EZ: Brand: IOBAN™ 2

## (undated) DEVICE — GLOVE SUR 6 SENSICARE PI PIP CRM PWD F

## (undated) DEVICE — SUT CHRM GUT 3-0 27IN SH ABSRB UD 26MM 1/2

## (undated) DEVICE — ELECTRODE ES 2.75IN PTFE BLDE MOD E-Z CLN

## (undated) DEVICE — PACK CDS LOWER EXTREMITY

## (undated) DEVICE — SOLUTION IRRIG 1000ML 0.9% NACL USP BTL

## (undated) DEVICE — NOVOSORB® BTM IS A BIODEGRADABLE POLYURETHANE POROUS MATRIX ADHERED TO A TRANSPARENT SEALING MEMBRANE. THE SEALING MEMBRANE IS DESIGNED TO PHYSIOLOGICALLY CLOSE THE WOUND LIMITING EVAPORATIVE MOISTURE LOSS DURING INTEGRATION OF THE MATRIX. NOVOSORB® BTM IS SUPPLIED IN VARIOUS SIZES, RANGING FROM 4CM2 TO 800CM2. NOVOSORB® BTM IS A FENESTRATED SINGLE USE, TERMINALLY STERILIZED DEVICE, INDIVIDUALLY PACKED IN A TRANSPARENT POLYMER POUCH ENCLOSED IN AN ALUMINIZED POUCH CONTAINED IN A CARDBOARD ENVELOPE.: Brand: NOVOSORB BTM

## (undated) NOTE — LETTER
Patient Name: Kevin Lindsey  Surgery Date: 4/23/2024  Medical Record: [id number] CSN: [id number]      Surgeon(s):  Salma Snowden MD  Consent Procedure: DEBRIDEMENT OF RIGHT LOWER EXTREMITY WITH BTM PLACEMENT  Anesthesia Type: Local      INITIATE ANTIBIOTICS CHECKED OFF ON CASE REQUEST.  IF ANTIBIOTICS ARE NEEEDED ON THIS LOCAL ANESTHESIA CASE, WE WILL NEED AN ORDER FOR AN IV AND IV FLUID.      THANK YOU,  PRE-ADMISSION TESTING  [phone]

## (undated) NOTE — LETTER
Patient Name: Kevin Lindsey  Surgery Date: 4/23/2024  Medical Record: [id number] CSN: [id number]      Surgeon(s):  Salma Snowden MD  Consent Procedure: DEBRIDEMENT OF RIGHT LOWER EXTREMITY WITH BTM PLACEMENT  Anesthesia Type: Local        PLEASE CLARIFY \"BTM\" PLACEMENT.  PLEASE SEND UPDATED SURGICAL CASE REQUEST ASAP.      THANK YOU,  PRE-ADMISSION TESTING